# Patient Record
Sex: FEMALE | Race: WHITE
[De-identification: names, ages, dates, MRNs, and addresses within clinical notes are randomized per-mention and may not be internally consistent; named-entity substitution may affect disease eponyms.]

---

## 2019-08-26 NOTE — ANESTHESIA
Pre-Anesthesia VS, & Labs





- Diagnosis





Umbilical hernia





- Procedure





umbilical hernia repair with mesh


Vital Signs: 





                                        











Temp Pulse Resp BP Pulse Ox


 


 36.4 C L  66   12   134/82 H  98 


 


 19 10:19  19 10:19  19 10:19  19 10:19  19 10:19














                                        





Height                           5 ft 9 in


Weight (kg)                      91 kg











- Pregnancy


Is Patient Pregnant?: No





Home Medications and Allergies


Home Medications: 


Ambulatory Orders





Omeprazole Magnesium [Prilosec] 20 mg PO DAILY 19 


Telmisartan [Micardis] 40 mg PO DAILY 19 











                                        





Omeprazole Magnesium [Prilosec] 20 mg PO DAILY 19 


Telmisartan [Micardis] 40 mg PO DAILY 19 








Allergies/Adverse Reactions: 


                                    Allergies











Allergy/AdvReac Type Severity Reaction Status Date / Time


 


No Known Drug Allergies Allergy   Verified 19 13:12














Anes History & Medical History





- Anesthetic History


Anesthesia Complications: reports: No previous complications





- Medical History


Cardiovascular: reports: Hypertension, Murmur


Pulmonary: reports: None


Gastrointestinal: reports: GERD (controlled with medication), Hepatitis 

(autoimmune hepatitis)


Urinary: reports: None


Neuro: reports: None


Musculoskeletal: reports: None


Endocrine/Autoimmune: reports: None


Blood Disorders: reports: None


Skin: reports: None


Smoking Status: Never smoker


Psychosocial: reports: No issues indicated





- Surgical History


Eyes Ears Nose Throat (EENT): Cataracts


Gynecologic:  section, Hysterectomy





Exam


General: Alert, Oriented x3, Cooperative, No acute distress


Dental: WNL


Mouth Opening: 3 Fingerbreadth


Neck Mobility: Normal


Thyromental Distance: greater than 6 cm


Respiratory: Lungs clear, Normal breath sounds, No respiratory distress, No 

accessory muscle use


Cardiovascular: Regular rate, Normal S1, Normal S2, No murmurs


Mental/Cognitive Status: Alert/Oriented X3, Normal for patient





Plan


Anesthesia Type: MAC


Consent for Procedure(s) Verified and Reviewed: Yes


Code Status: Attempt Resuscitation


ASA classification: 2-Mild systemic disease


Is this case an emergency?: No

## 2019-08-26 NOTE — OPERATIVE REPORT
DATE OF SERVICE: 08/26/2019

Physician: Baltazar Herman MD

 

PREOPERATIVE DIAGNOSIS:  Symptomatic ventral incisional hernia.

 

POSTOPERATIVE DIAGNOSIS:  Symptomatic ventral incisional hernia.

 

PROCEDURE PERFORMED:  Open repair of symptomatic ventral incisional hernia with Ventralex soft tissue
 patch.

 

ANESTHESIA:  Local plus monitored anesthesia care by Jazmin Garza CRNA.

 

SURGEON:  Baltazar Herman MD

 

ESTIMATED BLOOD LOSS:  5 mL

 

COMPLICATIONS:  None.

 

FINDINGS:  A 2 cm fascial defect was present in the midline at the umbilicus with preperitoneal fat p
resent within the hernia sac.  A small Ventralex ST patch was used for the reconstruction.

 

INDICATIONS:  Patient is a 53-year-old woman with a history of a progressively enlarging tender, pain
ful, reducible umbilical bulge.  She is status post several prior laparoscopies.  Examination reveale
d a partially reducible hernia consistent with a ventral incisional hernia.  She was advised to under
go elective repair.

 

TECHNIQUE:  After informed consent, patient was taken to the operating room and placed in supine posi
tion and sedated and monitored.  Preoperative preparation included application of sequential calf com
pression boots and administration of 2 grams cefazolin and 1 gram of vancomycin intravenously due to 
a past history of MRSA infection.  Her abdomen was prepared with iodoform solution, following which, 
a periumbilical block was instituted using a 50:50 combination of 1% lidocaine plain and 0.5% Marcain
e with epinephrine.  Approximately 20 mL of this mixture was used, plus additional 10 mL of 0.5% Jakob
rosalind with epinephrine.  The patient's abdomen was re-prepared with ChloraPrep solution and draped in 
the usual sterile fashion.  Transverse curvilinear incision was made in the supraumbilical region regine
roximately 4 cm in length.  Hemostasis achieved with electrocautery.  Incision carried down through s
ubcutaneous tissues.  The hernia sac was identified, mobilized circumferentially dissected free off o
f the overlying skin and the anterior fascia was mobilized circumferentially around the neck of the h
ernia sac.  The hernia sac was entered and excised and discarded.  Hernia contents were reduced into 
the peritoneal cavity, the fascial edges were mobilized circumferentially.  After hemostasis was assu
red, the wound was irrigated with antibiotic solution containing 1 gram of cefazolin per liter, A siz
e small Ventralex ST patch, which been soaked in antibiotic solution, was placed into the preperitone
al space, where it was seen to expand nicely against the anterior fascia.  The strap was then used to
 hold the mesh against the anterior fascia while the fascial edges were reapproximated transversely w
ith interrupted 0 Ethibond sutures.  Several sutures were used to incorporate the strap with the fasc
ial closure.  Excess strap was excised and discarded.  After hemostasis had been assured, the wound w
as then again irrigated with antibiotic solution, following which wound closure was accomplished in l
oconnor using interrupted 2-0 Vicryl to reapproximate the deep subcutaneous tissues, followed by contin
uous 3-0 Vicryl for the superficial subcutaneous tissues, followed by 4-0 Monocryl subcuticular skin 
closure, followed by Dermabond.  The procedure was then terminated, and the patient was transferred o
ut of the operating room in satisfactory condition.  Sponge and needle counts were correct x2.  No dr rivas were used.

 

 

DD: 08/26/2019 12:35

TD: 08/26/2019 12:38

Job #: [id number]

## 2023-02-19 ENCOUNTER — HOSPITAL ENCOUNTER (OUTPATIENT)
Dept: HOSPITAL 76 - EMS | Age: 58
Discharge: ANOTHER HOSPITAL | End: 2023-02-19
Payer: COMMERCIAL

## 2023-02-19 ENCOUNTER — HOSPITAL ENCOUNTER (EMERGENCY)
Dept: HOSPITAL 76 - ED | Age: 58
Discharge: HOME | End: 2023-02-19
Payer: COMMERCIAL

## 2023-02-19 VITALS — DIASTOLIC BLOOD PRESSURE: 72 MMHG | SYSTOLIC BLOOD PRESSURE: 130 MMHG

## 2023-02-19 DIAGNOSIS — I10: ICD-10-CM

## 2023-02-19 DIAGNOSIS — D89.89: ICD-10-CM

## 2023-02-19 DIAGNOSIS — R00.2: Primary | ICD-10-CM

## 2023-02-19 LAB
ALBUMIN DIAFP-MCNC: 3.9 G/DL (ref 3.2–5.5)
ALBUMIN/GLOB SERPL: 1.2 {RATIO} (ref 1–2.2)
ALP SERPL-CCNC: 53 IU/L (ref 42–121)
ALT SERPL W P-5'-P-CCNC: 32 IU/L (ref 10–60)
ANION GAP SERPL CALCULATED.4IONS-SCNC: 12 MMOL/L (ref 6–13)
AST SERPL W P-5'-P-CCNC: 24 IU/L (ref 10–42)
BASOPHILS NFR BLD AUTO: 0 10^3/UL (ref 0–0.1)
BASOPHILS NFR BLD AUTO: 0.4 %
BILIRUB BLD-MCNC: 1.3 MG/DL (ref 0.2–1)
BUN SERPL-MCNC: 14 MG/DL (ref 6–20)
CALCIUM UR-MCNC: 9.1 MG/DL (ref 8.5–10.3)
CHLORIDE SERPL-SCNC: 94 MMOL/L (ref 101–111)
CO2 SERPL-SCNC: 28 MMOL/L (ref 21–32)
CREAT SERPLBLD-SCNC: 0.7 MG/DL (ref 0.4–1)
EOSINOPHIL # BLD AUTO: 0.1 10^3/UL (ref 0–0.7)
EOSINOPHIL NFR BLD AUTO: 0.9 %
ERYTHROCYTE [DISTWIDTH] IN BLOOD BY AUTOMATED COUNT: 13.1 % (ref 12–15)
GFRSERPLBLD MDRD-ARVRAT: 86 ML/MIN/{1.73_M2} (ref 89–?)
GLOBULIN SER-MCNC: 3.3 G/DL (ref 2.1–4.2)
GLUCOSE SERPL-MCNC: 92 MG/DL (ref 70–100)
HCT VFR BLD AUTO: 38.7 % (ref 37–47)
HGB UR QL STRIP: 12.6 G/DL (ref 12–16)
LYMPHOCYTES # SPEC AUTO: 1 10^3/UL (ref 1.5–3.5)
LYMPHOCYTES NFR BLD AUTO: 9.9 %
MAGNESIUM SERPL-MCNC: 1.9 MG/DL (ref 1.7–2.8)
MCH RBC QN AUTO: 31.7 PG (ref 27–31)
MCHC RBC AUTO-ENTMCNC: 32.6 G/DL (ref 32–36)
MCV RBC AUTO: 97.5 FL (ref 81–99)
MONOCYTES # BLD AUTO: 0.7 10^3/UL (ref 0–1)
MONOCYTES NFR BLD AUTO: 6.8 %
NEUTROPHILS # BLD AUTO: 7.9 10^3/UL (ref 1.5–6.6)
NEUTROPHILS # SNV AUTO: 9.6 X10^3/UL (ref 4.8–10.8)
NEUTROPHILS NFR BLD AUTO: 81.6 %
NRBC # BLD AUTO: 0 /100WBC
NRBC # BLD AUTO: 0 X10^3/UL
PDW BLD AUTO: 10 FL (ref 7.9–10.8)
PLATELET # BLD: 186 10^3/UL (ref 130–450)
POTASSIUM SERPL-SCNC: 3.5 MMOL/L (ref 3.5–5)
PROT SPEC-MCNC: 7.2 G/DL (ref 6.7–8.2)
RBC MAR: 3.97 10^6/UL (ref 4.2–5.4)
SODIUM SERPLBLD-SCNC: 134 MMOL/L (ref 135–145)

## 2023-02-19 PROCEDURE — 99284 EMERGENCY DEPT VISIT MOD MDM: CPT

## 2023-02-19 PROCEDURE — 93005 ELECTROCARDIOGRAM TRACING: CPT

## 2023-02-19 PROCEDURE — 99285 EMERGENCY DEPT VISIT HI MDM: CPT

## 2023-02-19 PROCEDURE — 85025 COMPLETE CBC W/AUTO DIFF WBC: CPT

## 2023-02-19 PROCEDURE — 71275 CT ANGIOGRAPHY CHEST: CPT

## 2023-02-19 PROCEDURE — 84443 ASSAY THYROID STIM HORMONE: CPT

## 2023-02-19 PROCEDURE — 80053 COMPREHEN METABOLIC PANEL: CPT

## 2023-02-19 PROCEDURE — 36415 COLL VENOUS BLD VENIPUNCTURE: CPT

## 2023-02-19 PROCEDURE — 83735 ASSAY OF MAGNESIUM: CPT

## 2023-02-19 NOTE — ED PHYSICIAN DOCUMENTATION
PD HPI CHEST PAIN





- Stated complaint


Stated Complaint: HEART PALP





- Chief complaint


Chief Complaint: Cardiac





- History obtained from


History obtained from: Patient





- Additional information


Additional information: 





57-year-old woman visiting from [place].  Recent diagnosis of rheumatoid 

arthritis on a steroid taper currently at 15 mg a day.  She has a history of 

palpitations with negative Holter monitoring may be 20 years ago.  Today she 

felt heart fluttering which has since resolved.  She feels fatigued.  There is 

no chest pain or trouble breathing.  No pedal edema or calf pain.  Symptoms are 

better now.  EMS noted PVCs prior to arrival.





PD PAST MEDICAL HISTORY





- Past Medical History


Cardiovascular: Hypertension, Murmur


Respiratory: None


Neuro: None


Endocrine/Autoimmune: None


GI: GERD (controlled with medication), Hepatitis (autoimmune hepatitis)


: None


HEENT: None


Psych: None


Musculoskeletal: None


Derm: None





- Past Surgical History


/GYN:  section, Hysterectomy


HEENT: Cataracts





- Present Medications


Home Medications: 


                                Ambulatory Orders











 Medication  Instructions  Recorded  Confirmed


 


Omeprazole Magnesium [Prilosec] 20 mg PO DAILY 19


 


Telmisartan [Micardis] 40 mg PO DAILY 19


 


oxyCODONE [Roxicodone] 5 mg PO Q6H PRN #10 tablet 19 


 


atenoloL [Tenormin] 25 mg PO DAILY #30 tablet 23 














- Allergies


Allergies/Adverse Reactions: 


                                    Allergies











Allergy/AdvReac Type Severity Reaction Status Date / Time


 


No Known Drug Allergies Allergy   Verified 19 13:12














- Social History


Smoking Status: Never smoker





PD ED PE NORMAL





- Vitals


Vital signs reviewed: Yes





- General


General: Alert and oriented X 3, No acute distress





- Neck


Neck: Supple, no meningeal sign, No bony TTP





- Cardiac


Cardiac: RRR, No murmur





- Respiratory


Respiratory: No respiratory distress, Clear bilaterally





- Abdomen


Abdomen: Non tender





- Back


Back: No CVA TTP, No spinal TTP





- Derm


Derm: Normal color, Warm and dry





- Extremities


Extremities: No edema, No calf tenderness / cord





- Neuro


Neuro: Alert and oriented X 3, Normal speech





Results





- Vitals


Vitals: 


                               Vital Signs - 24 hr











  23





  12:07 15:48


 


Temperature 37.3 C 


 


Heart Rate 96 79


 


Respiratory 12 16





Rate  


 


Blood Pressure 137/80 H 130/72


 


O2 Saturation 97 96








                                     Oxygen











O2 Source                      Room air

















- EKG (time done)


  ** 1238


Rate: Rate (enter#) (95)


Rhythm: NSR


Axis: LAD (Borderline)


Intervals: Normal OH


QRS: Normal


Ischemia: Normal ST segments





- Labs


Labs: 


                                Laboratory Tests











  23





  12:34 12:34 12:34


 


WBC  9.6  


 


RBC  3.97 L  


 


Hgb  12.6  


 


Hct  38.7  


 


MCV  97.5  


 


MCH  31.7 H  


 


MCHC  32.6  


 


RDW  13.1  


 


Plt Count  186  


 


MPV  10.0  


 


Neut # (Auto)  7.9 H  


 


Lymph # (Auto)  1.0 L  


 


Mono # (Auto)  0.7  


 


Eos # (Auto)  0.1  


 


Baso # (Auto)  0.0  


 


Absolute Nucleated RBC  0.00  


 


Nucleated RBC %  0.0  


 


Sodium   134 L 


 


Potassium   3.5 


 


Chloride   94 L 


 


Carbon Dioxide   28 


 


Anion Gap   12.0 


 


BUN   14 


 


Creatinine   0.7 


 


Estimated GFR (MDRD)   86 L 


 


Glucose   92 


 


Calcium   9.1 


 


Magnesium   1.9 


 


Total Bilirubin   1.3 H 


 


AST   24 


 


ALT   32 


 


Alkaline Phosphatase   53 


 


Total Protein   7.2 


 


Albumin   3.9 


 


Globulin   3.3 


 


Albumin/Globulin Ratio   1.2 


 


TSH    1.05














- Rads (name of study)


  ** CT pulmonary angiogram is negative


Radiology: Final report received, EMP read indepedently





PD Medical Decision Making





- ED course


ED course: 





57-year-old woman presents with palpitations, recent diagnosis of rheumatoid 

arthritis and some generalized weakness.  She also notes shortness of breath and

 while here developed some throbbing associated with her pulse but not painful 

in the back.  She did have occasional PVCs here but they did not necessarily 

correlate with her symptoms, her symptoms were more constant than her occasional

 PVCs.  We noticed that she had a mild resting tachycardia, right around 100 and

 at times had a borderline pulse oximetry of 93-94.  She denies any recent 

unilateral calf pain, she did have some bilateral calf pain while walking the 

other day and has been taking a lot of car trips lately albeit short.  

Constellation of findings is concerning for potential PE and CT angiogram will 

be done.  CBC reviewed and normal.  CMP reviewed with very mild hyponatremia, 

otherwise negative.  TSH reviewed and normal.





Departure





- Departure


Disposition: 01 Home, Self Care


Clinical Impression: 


 Palpitations, Autoimmune disease





Condition: Good


Record reviewed to determine appropriate education?: Yes


Instructions:  ED Palpitations


Prescriptions: 


atenoloL [Tenormin] 25 mg PO DAILY #30 tablet


Comments: 


You were seen today for palpitations.  We noted occasional PVCs which may be the

 cause of that.  He also had some upper back throbbing with some low-grade 

tachycardia around 100.  A CT pulmonary angiogram was negative for blood clots. 

 We did labs with a normal CBC, normal CMP, normal TSH.  We are starting you on 

beta-blockers for the palpitation symptoms.  You should follow-up with your 

doctor later this week for further evaluation and treatment.  Return for new or 

worsening symptoms.

## 2023-02-19 NOTE — CT REPORT
PROCEDURE:  ANGIO CHEST W/WO

 

INDICATIONS:  back pain, dyspnea

 

CONTRAST: 80 omni 300 

 

TECHNIQUE:  

After the administration of intravenous contrast, 2 mm axial images were acquired from the pulmonary 
apices to the posterior costophrenic angles during the arterial phase. In addition, 1 mm lung kernel 
and 5 mm soft tissue kernel reconstructions were performed. 3-dimensional coronal oblique maximum int
ensity projection (MIP) reformats, 8 mm axial MIP, and 5 mm coronal and sagittal MPR reformats were t
hen performed through the thorax. For radiation dose reduction, the following was used: automated exp
osure control, adjustment of mA and/or kV according to patient size.

 

 

COMPARISON:  None

 

FINDINGS:  

Image quality:  Excellent.  

 

Pulmonary arteries:  Pulmonary arteries are normal in size, and demonstrate no intraluminal filling d
efects to suggest central pulmonary embolism.  

 

Lungs and pleura:  Lungs are clear.  No pleural effusions or pneumothorax.  Central and peripheral ai
rways are patent.  

 

Mediastinum:  Heart size is normal, without pericardial effusion.  No mediastinal or hilar adenopathy
.  Thoracic aorta is normal in caliber and enhancement.  Esophagus is normal in caliber, without hiat
al hernia.  

 

Bones and chest wall:  No suspicious bony lesions.  Ribs and thoracic spine appear intact throughout.
  Age-appropriate degenerative changes are seen.   No axillary or supraclavicular adenopathy.  The th
yroid is normal in size and there are no incidental findings.

 

Abdomen:  Visualized upper abdominal solid organs appear normal in the early arterial phase of enhanc
ement.  

 

 

 

 

 

IMPRESSION:  

 

No findings of pulmonary embolism.

 

Clear lungs.

 

A cause of back pain is not seen.

 

 

Reviewed by: Flip Flores MD on 2/19/2023 2:35 PM [facility]

Approved by: Flip Flores MD on 2/19/2023 2:35 PM [facility]

 

 

Station ID:  [id number]